# Patient Record
Sex: MALE | NOT HISPANIC OR LATINO | Employment: UNEMPLOYED | URBAN - METROPOLITAN AREA
[De-identification: names, ages, dates, MRNs, and addresses within clinical notes are randomized per-mention and may not be internally consistent; named-entity substitution may affect disease eponyms.]

---
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
## 2019-02-11 DIAGNOSIS — I77.810 AORTIC ROOT DILATION (H): ICD-10-CM

## 2019-02-11 DIAGNOSIS — Q67.7 PECTUS CARINATUM: ICD-10-CM

## 2019-02-11 DIAGNOSIS — Q87.40 MARFAN SYNDROME: Primary | ICD-10-CM

## 2019-02-11 DIAGNOSIS — Q67.6 PECTUS EXCAVATUM: ICD-10-CM

## 2022-01-28 ENCOUNTER — TELEPHONE (OUTPATIENT)
Dept: CARDIOLOGY | Facility: CLINIC | Age: 22
End: 2022-01-28
Payer: COMMERCIAL

## 2022-01-28 DIAGNOSIS — I77.810 AORTIC ROOT DILATION (H): Primary | ICD-10-CM

## 2022-01-28 DIAGNOSIS — Q87.40 MARFAN SYNDROME: ICD-10-CM

## 2022-02-01 NOTE — TELEPHONE ENCOUNTER
Date: 2/1/2022    Time of Call: 2:28 PM     Diagnosis:  Marfan's     [ TORB ] Ordering provider: Dr. Sprague  Order: echo prior to appointment with Dr. Sprague     Order received by: LAURENT Maria     Follow-up/additional notes: Sent to scheduling.

## 2022-03-17 ENCOUNTER — OFFICE VISIT (OUTPATIENT)
Dept: CARDIOLOGY | Facility: CLINIC | Age: 22
End: 2022-03-17
Payer: COMMERCIAL

## 2022-03-17 ENCOUNTER — HOSPITAL ENCOUNTER (OUTPATIENT)
Dept: CARDIOLOGY | Facility: CLINIC | Age: 22
Discharge: HOME | End: 2022-03-17
Attending: INTERNAL MEDICINE | Admitting: INTERNAL MEDICINE
Payer: COMMERCIAL

## 2022-03-17 VITALS
OXYGEN SATURATION: 96 % | BODY MASS INDEX: 29.34 KG/M2 | DIASTOLIC BLOOD PRESSURE: 83 MMHG | WEIGHT: 236 LBS | SYSTOLIC BLOOD PRESSURE: 126 MMHG | HEIGHT: 75 IN | HEART RATE: 83 BPM

## 2022-03-17 DIAGNOSIS — I77.810 AORTIC ROOT DILATION (H): ICD-10-CM

## 2022-03-17 DIAGNOSIS — Q87.40 MARFAN SYNDROME: ICD-10-CM

## 2022-03-17 LAB — LVEF ECHO: NORMAL

## 2022-03-17 PROCEDURE — 93306 TTE W/DOPPLER COMPLETE: CPT

## 2022-03-17 PROCEDURE — 93306 TTE W/DOPPLER COMPLETE: CPT | Mod: 26 | Performed by: INTERNAL MEDICINE

## 2022-03-17 PROCEDURE — 93005 ELECTROCARDIOGRAM TRACING: CPT | Performed by: INTERNAL MEDICINE

## 2022-03-17 PROCEDURE — 99205 OFFICE O/P NEW HI 60 MIN: CPT | Mod: 25 | Performed by: INTERNAL MEDICINE

## 2022-03-17 RX ORDER — LOSARTAN POTASSIUM 25 MG/1
25 TABLET ORAL DAILY
Qty: 90 TABLET | Refills: 3 | Status: SHIPPED | OUTPATIENT
Start: 2022-03-17

## 2022-03-17 NOTE — NURSING NOTE
Med Reconcile: Reviewed and verified all current medications with the patient. The updated medication list was printed and given to the patient.    New Medication: Patient was educated regarding newly prescribed medication, including discussion of  the indication, administration, side effects, and when to report to MD or RN. Patient demonstrated understanding of this information and agreed to call with further questions or concerns.    Return Appointment: Patient given instructions regarding scheduling next clinic visit. Patient demonstrated understanding of this information and agreed to call with further questions or concerns.    Patient stated he understood all health information given and agreed to call with further questions or concerns.

## 2022-03-17 NOTE — PROGRESS NOTES
Service Date: 03/17/2022    HISTORY OF PRESENT ILLNESS:  Mr. Garcia is a pleasant 22-year-old gentleman whom meeting for the first time with a past medical history significant for Marfan syndrome.  This was diagnosed in 2012.  His primary physician suspected Marfan syndrome.  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  This result is consistent with Marfan syndrome.  He was followed by Dr. Abreu.  He was seen by Dr. Castillo as well as Dr. Lee and then most recently Dr. Bowens. His last echocardiogram actually was in 2018, and at that time, I measured his aortic root at 3.8 cm measured out placing his Z-score at 2.6.  He underwent an echo as part of his evaluation today and actually his Z-score is unchanged and his aortic root is unchanged at 3.8.  His ascending aorta measures 3.1 cm.  He has a blood pressure of 126/83, and currently, he is not taking any medications.  His last CT scan was in 2013 and they measured his root at that time at 3.5 cm.  His echo shows no significant valve disease with a normal ejection fraction.     Mr. Garcia has no known family history of Marfan syndrome.  He has 1 half-sister.  He works as a care provider administering medications.  He has no history of joint dislocations.  He does have a history of subtle lens dislocation with possibly some ruptured zonules but has not had a recent Ophthalmology visit but does know he needs to get in.  He does wear contact lenses.  He actually was admitted in January of this year with a pneumothorax.  He did have a known history of blebs in the right hand side but had never had a pneumothorax in the past.    IMPRESSION, REPORT AND PLAN:  Marfan syndrome with mutation in FBN1 gene, aortic root at 3.8 cm, stable since 2018, with a Z-score of 2.6 with a history of a pneumothorax and subtle lens dislocation on the left.    DISCUSSION:  It was a pleasure to be  involved in the care of Mr. Garcia.  I have reviewed his history in detail and his testing as outlined.  I discussed his anatomy through our heart model.  He and his mom actually were thankful for this as they thought this whole time that there was actually something wrong with his valve.  They did not understand it was his aorta that we are following.  We discussed the indication for losartan and the reasoning behind using it and he is going to try a small dose.  It sounds like he tried beta blocker in the past and was not happy with the side effects of this medication from a sexual standpoint so we will never go on that again.  We discussed the next year doing MRA of the chest.  He did have imaging of his head back in 2016 and that was not concerning for any aneurysms with just congenital abnormalities not of concern, so I think it is fine to wait a full 10 years for reimaging from that standpoint.  He knows not to lift heavy weights and exercise restrictions but to otherwise remain active.  I have recommended he get in to see the ophthalmologist.  He did have a history of modified Ravitch repair with placement of a sidebar to stabilize his chest in , removal of the sidebar in , and it sounds like that has been doing well.  He does now know what a pneumothorax feels like and was told to look out for those symptoms in the future.  We will plan to do labs including a TSH, glucose and lipids when he follows up.  All questions were answered.  It was a pleasure to be involved in his care.  Please do not hesitate to contact me with any questions or concerns.    There was 60 minutes of face-to-face, documentation and review of records on the day of visit.    Hu Sprague MD        D: 2022   T: 2022   MT: [email or web address]    Name:     OFELIA GARCIA  MRN:      [id number]        Account:      [id number]   :      2000           Service Date: 2022       Document: [id number]

## 2022-03-17 NOTE — PATIENT INSTRUCTIONS
You were seen today in the Adult Congenital and Cardiovascular Genetics Clinic at the [facility].    Cardiology Providers you saw during your visit:  NIKOLAY Sprague MD    Diagnosis:  Marfan's     Results:  NIKOLAY Sprague MD reviewed the results of your ekg and echo testing today in clinic.    Recommendations:    1. Continue to eat a heart healthy, low salt diet.  2. Continue to get 20-30 minutes of aerobic activity, 4-5 days per week.  Examples of aerobic activity include walking, running, swimming, cycling, etc.  3. Continue to observe good oral hygiene, with regular dental visits.  4. Start Losartan 25 mg DAILY      SBE prophylaxis:   Yes____  No__x__    Lifelong Bacterial Endocarditis Prophylaxis:  YES____  NO____    If YES is checked, follow the recommendations outlined below:  1. Take antibiotic(s) prior to recommended dental procedures and procedures on the respiratory tract or with infected skin, muscle or bones. SBE prophylaxis is not needed for routine GI and  procedures (ie. Colonoscopy or vaginal delivery)  2. Observe good oral hygiene daily, as advised by your dentist. Get regular professional dental care.  3. Keep cuts clean.  4. Infections should be treated promptly.  5. Symptoms of Infective Endocarditis could include: fever lasting more than 4-5 days or a recurrent fever that initially resolves but returns within 1-2 days)      Exercise restrictions:   Yes__X__  No____         If yes, list restrictions:  Must be allowed to rest if fatigued or SOB      Work restrictions:  Yes____  No_X___         If yes, list restrictions:    FASTING CHOLESTEROL was checked in the last 5 years YES___  NO___ Will draw next year  Continue to eat a heart healthy, low salt diet.         ____ Fasting lipid panel order today         ____ No changes in medications          ____ I recommend the following changes in your cholesterol medications.:          ____ Please follow up for cholesterol screening at  your primary care physician      Follow-up:  Follow up with Dr. Sprague in 1 year with chest MRA, thyroid, fasting glucose, and lipid labs prior    If you have questions or concerns please contact us at:    RONAN LomasN, RN    Mallory Benitez (Scheduling)  Nurse Care Coordinator     Clinic   Adult Congenital and CV Genetics   Adult Congenital and CV Genetic  [facility]   [facility]  (P) [phone]     (P) [phone]         (F) [phone]        For after hours urgent needs, call [phone] and ask to speak to the Adult Congenital Physician on call.  Mention Job Code 0401.    For emergencies call 911.    [facility]   Clinics and Surgery Center  Mail Code [id number]  [address] [place]  [address]

## 2022-03-17 NOTE — LETTER
3/17/2022    [facility]  [address]  [place] [address]    RE: Ankur Garcia       Dear Colleague,     I had the pleasure of seeing Ankur Garcia in the [facility].  CARDIOLOGY CONSULTATION:    Please see dictated note    PAST MEDICAL HISTORY:  Past Medical History:   Diagnosis Date     Asthma      Congenital heart disease      Marfan's syndrome      Pectus excavatum      PONV (postoperative nausea and vomiting)        CURRENT MEDICATIONS:  Current Outpatient Medications   Medication Sig Dispense Refill     LORazepam (ATIVAN) 1 MG tablet Take 1 tablet (1 mg) by mouth every 8 hours as needed for anxiety 10 tablet 0     LORazepam (ATIVAN) 1 MG tablet Take 2 tablets (2 mg) by mouth every 8 hours as needed for nausea or vomiting (Take 1-2 mg every 8 hours as needed for nausea/vomiting.) 20 tablet 0     losartan (COZAAR) 50 MG tablet Take 1 tablet (50 mg) by mouth daily 40 tablet 5     QUEtiapine (SEROQUEL) 100 MG tablet Take 100 mg by mouth 2 times daily       ranitidine (ZANTAC) 150 MG tablet Take 1 tablet (150 mg) by mouth 2 times daily 60 tablet 1       PAST SURGICAL HISTORY:  Past Surgical History:   Procedure Laterality Date     ADENOIDECTOMY  2008    also a nasal cautery for recurrent nose bleeds.     APPENDECTOMY  2009     APPENDECTOMY       CHEST SURGERY       NASAL SEPTUM SURGERY       REMOVE HARDWARE PECTUS RAVITCH  7/24/2013    Procedure: REMOVE HARDWARE PECTUS (RAVITCH);  Pectus Bar Removal (Ravitch);  Surgeon: Ryan Castillo MD;  [facility]: [facility]     REPAIR PECTUS CARINATUM  6/20/2012    Procedure: REPAIR PECTUS CARINATUM;;  Surgeon: Ryan Castillo MD;  [facility]     REPAIR PECTUS EXCAVATUM  6/20/2012    Procedure: REPAIR PECTUS EXCAVATUM;  Pectus Excavatum and Carinatum Repair (RAVITCH);  Surgeon: Ryan Castillo MD;  [facility]       ALLERGIES  Grass, Trees, and Penicillins    FAMILY HX:  Family History   Problem Relation Age of Onset  "    Hypertension Mother      Arrhythmia Mother         A Fib     Heart Surgery Maternal Grandfather         Aortic Valve     Cerebrovascular Disease Paternal Grandmother        SOCIAL HX:  Social History     Socioeconomic History     Marital status: Single     Spouse name: Not on file     Number of children: Not on file     Years of education: Not on file     Highest education level: Not on file   Occupational History     Not on file   Tobacco Use     Smoking status: Current Every Day Smoker     Smokeless tobacco: Not on file   Substance and Sexual Activity     Alcohol use: No     Alcohol/week: 0.0 standard drinks     Drug use: Yes     Comment: Drug use: Xanax     Sexual activity: Not on file   Other Topics Concern     Not on file   Social History Narrative     Not on file     Social Determinants of Health     Financial Resource Strain: Not on file   Food Insecurity: Not on file   Transportation Needs: Not on file   Physical Activity: Not on file   Stress: Not on file   Social Connections: Not on file   Intimate Partner Violence: Not on file   Housing Stability: Not on file       ROS:  Constitutional: No fever, chills, or sweats. No weight gain/loss.   ENT: No visual disturbance, ear ache, epistaxis, sore throat.   Allergies/Immunologic: Negative.   Respiratory: No cough, hemoptysis.   Cardiovascular: As per HPI.   GI: No nausea, vomiting, hematemesis, melena, or hematochezia.   : No urinary frequency, dysuria, or hematuria.   Integument: Negative.   Psychiatric: Negative.   Neuro: Negative.   Endocrinology: Negative.   Musculoskeletal: No myalgia.    VITAL SIGNS:  /83   Pulse 83   Ht 1.892 m (6' 2.5\")   Wt 107 kg (236 lb)   SpO2 96%   BMI 29.90 kg/m    Body mass index is 29.9 kg/m .  Wt Readings from Last 2 Encounters:   10/24/18 79.9 kg (176 lb 1.6 oz) (77 %, Z= 0.75)*   07/22/18 73.3 kg (161 lb 9.6 oz) (61 %, Z= 0.29)*     * Growth percentiles are based on CDC (Boys, 2-20 Years) data.       PHYSICAL " EXAM  Ankur Garcia IS A 22 year old male.in no acute distress.  HEENT: Unremarkable.   Lungs: CTA.  Cor: RRR. Normal S1 and S2.  No murmur, rub, or gallop.  Abd: Soft, nontender Extremities: No C/C/E.    LABS    Lab Results   Component Value Date    WBC 8.2 06/19/2019    WBC 8.3 07/22/2018     Lab Results   Component Value Date    RBC 5.44 06/19/2019    RBC 5.14 07/22/2018     Lab Results   Component Value Date    HGB 15.4 06/19/2019    HGB 14.6 07/22/2018     Lab Results   Component Value Date    HCT 45.3 06/19/2019    HCT 42.2 07/22/2018     No components found for: MCT  Lab Results   Component Value Date    MCV 83 06/19/2019    MCV 82 07/22/2018     Lab Results   Component Value Date    MCH 28.3 06/19/2019    MCH 28.4 07/22/2018     Lab Results   Component Value Date    MCHC 34.0 06/19/2019    MCHC 34.6 07/22/2018     Lab Results   Component Value Date    RDW 12.4 06/19/2019    RDW 12.0 07/22/2018     Lab Results   Component Value Date     06/19/2019     07/22/2018      Recent Labs   Lab Test 06/25/19  0501 06/24/19  1444    140   POTASSIUM 3.6 3.2*   CHLORIDE 106 100   CO2 28 25   ANIONGAP 8 15    88   BUN 6* 9   CR 0.92 0.95   WALLY 8.9 9.9       NIKOLAY Sprague MD

## 2022-03-17 NOTE — PROGRESS NOTES
CARDIOLOGY CONSULTATION:    Please see dictated note    PAST MEDICAL HISTORY:  Past Medical History:   Diagnosis Date     Asthma      Congenital heart disease      Marfan's syndrome      Pectus excavatum      PONV (postoperative nausea and vomiting)        CURRENT MEDICATIONS:  Current Outpatient Medications   Medication Sig Dispense Refill     LORazepam (ATIVAN) 1 MG tablet Take 1 tablet (1 mg) by mouth every 8 hours as needed for anxiety 10 tablet 0     LORazepam (ATIVAN) 1 MG tablet Take 2 tablets (2 mg) by mouth every 8 hours as needed for nausea or vomiting (Take 1-2 mg every 8 hours as needed for nausea/vomiting.) 20 tablet 0     losartan (COZAAR) 50 MG tablet Take 1 tablet (50 mg) by mouth daily 40 tablet 5     QUEtiapine (SEROQUEL) 100 MG tablet Take 100 mg by mouth 2 times daily       ranitidine (ZANTAC) 150 MG tablet Take 1 tablet (150 mg) by mouth 2 times daily 60 tablet 1       PAST SURGICAL HISTORY:  Past Surgical History:   Procedure Laterality Date     ADENOIDECTOMY  2008    also a nasal cautery for recurrent nose bleeds.     APPENDECTOMY  2009     APPENDECTOMY       CHEST SURGERY       NASAL SEPTUM SURGERY       REMOVE HARDWARE PECTUS RAVITCH  7/24/2013    Procedure: REMOVE HARDWARE PECTUS (RAVITCH);  Pectus Bar Removal (Ravitch);  Surgeon: Ryan Castillo MD;  [facility]     REPAIR PECTUS CARINATUM  6/20/2012    Procedure: REPAIR PECTUS CARINATUM;;  Surgeon: Ryan Castillo MD;  [facility]     REPAIR PECTUS EXCAVATUM  6/20/2012    Procedure: REPAIR PECTUS EXCAVATUM;  Pectus Excavatum and Carinatum Repair (RAVITCH);  Surgeon: Ryan Castillo MD;  [facility]       ALLERGIES  Grass, Trees, and Penicillins    FAMILY HX:  Family History   Problem Relation Age of Onset     Hypertension Mother      Arrhythmia Mother         A Fib     Heart Surgery Maternal Grandfather         Aortic Valve     Cerebrovascular Disease Paternal Grandmother        SOCIAL HX:  Social History  "    Socioeconomic History     Marital status: Single     Spouse name: Not on file     Number of children: Not on file     Years of education: Not on file     Highest education level: Not on file   Occupational History     Not on file   Tobacco Use     Smoking status: Current Every Day Smoker     Smokeless tobacco: Not on file   Substance and Sexual Activity     Alcohol use: No     Alcohol/week: 0.0 standard drinks     Drug use: Yes     Comment: Drug use: Xanax     Sexual activity: Not on file   Other Topics Concern     Not on file   Social History Narrative     Not on file     Social Determinants of Health     Financial Resource Strain: Not on file   Food Insecurity: Not on file   Transportation Needs: Not on file   Physical Activity: Not on file   Stress: Not on file   Social Connections: Not on file   Intimate Partner Violence: Not on file   Housing Stability: Not on file       ROS:  Constitutional: No fever, chills, or sweats. No weight gain/loss.   ENT: No visual disturbance, ear ache, epistaxis, sore throat.   Allergies/Immunologic: Negative.   Respiratory: No cough, hemoptysis.   Cardiovascular: As per HPI.   GI: No nausea, vomiting, hematemesis, melena, or hematochezia.   : No urinary frequency, dysuria, or hematuria.   Integument: Negative.   Psychiatric: Negative.   Neuro: Negative.   Endocrinology: Negative.   Musculoskeletal: No myalgia.    VITAL SIGNS:  /83   Pulse 83   Ht 1.892 m (6' 2.5\")   Wt 107 kg (236 lb)   SpO2 96%   BMI 29.90 kg/m    Body mass index is 29.9 kg/m .  Wt Readings from Last 2 Encounters:   10/24/18 79.9 kg (176 lb 1.6 oz) (77 %, Z= 0.75)*   07/22/18 73.3 kg (161 lb 9.6 oz) (61 %, Z= 0.29)*     * Growth percentiles are based on CDC (Boys, 2-20 Years) data.       PHYSICAL EXAM  Ankur Garcia IS A 22 year old male.in no acute distress.  HEENT: Unremarkable.   Lungs: CTA.  Cor: RRR. Normal S1 and S2.  No murmur, rub, or gallop.  Abd: Soft, nontender Extremities: No " C/C/E.    LABS    Lab Results   Component Value Date    WBC 8.2 06/19/2019    WBC 8.3 07/22/2018     Lab Results   Component Value Date    RBC 5.44 06/19/2019    RBC 5.14 07/22/2018     Lab Results   Component Value Date    HGB 15.4 06/19/2019    HGB 14.6 07/22/2018     Lab Results   Component Value Date    HCT 45.3 06/19/2019    HCT 42.2 07/22/2018     No components found for: MCT  Lab Results   Component Value Date    MCV 83 06/19/2019    MCV 82 07/22/2018     Lab Results   Component Value Date    MCH 28.3 06/19/2019    MCH 28.4 07/22/2018     Lab Results   Component Value Date    MCHC 34.0 06/19/2019    MCHC 34.6 07/22/2018     Lab Results   Component Value Date    RDW 12.4 06/19/2019    RDW 12.0 07/22/2018     Lab Results   Component Value Date     06/19/2019     07/22/2018      Recent Labs   Lab Test 06/25/19  0501 06/24/19  1444    140   POTASSIUM 3.6 3.2*   CHLORIDE 106 100   CO2 28 25   ANIONGAP 8 15    88   BUN 6* 9   CR 0.92 0.95   WALLY 8.9 9.9       NIKOLAY Sprague MD

## 2023-01-04 ENCOUNTER — TELEPHONE (OUTPATIENT)
Dept: CARDIOLOGY | Facility: CLINIC | Age: 23
End: 2023-01-04

## 2023-01-04 NOTE — TELEPHONE ENCOUNTER
Attempted to reach out to pt to schedule follow ur with March with Fasting Labs and MRA chest prior, no answer, LVM

## 2023-01-04 NOTE — LETTER
February 22, 2023      TO: Ankur Garcia  [address]  [place] [address]         Dear Ankur,    Our records indicate that it is time for you to schedule your return visit with the [facility] in the CARDIOVASCULAR GENETIC CLINIC at the [facility] ([facility]).      To make your appointment, please call: [phone], Monday - Friday, 8 A.M. - 4:30 P.M (Central Time Zone).    Please check with your insurance company about your benefits.  Some insurance plans provide different coverage levels for services at [facility] hospital-based clinics.     We look forward to hearing from you.    Sincerely,    Mallory Benitez  Clinic Coordinator  [facility]  Office: [phone]  Fax: [phone]

## 2023-02-22 NOTE — TELEPHONE ENCOUNTER
2nd attempt.    Sent letter. Max attempts, if patient wishes to still be seen, please call [phone]

## 2024-11-22 ENCOUNTER — HOSPITAL ENCOUNTER (EMERGENCY)
Facility: CLINIC | Age: 24
Discharge: HOME | End: 2024-11-22
Attending: EMERGENCY MEDICINE | Admitting: EMERGENCY MEDICINE
Payer: COMMERCIAL

## 2024-11-22 VITALS
HEART RATE: 67 BPM | TEMPERATURE: 98.1 F | BODY MASS INDEX: 28.6 KG/M2 | WEIGHT: 230 LBS | HEIGHT: 75 IN | SYSTOLIC BLOOD PRESSURE: 136 MMHG | OXYGEN SATURATION: 98 % | RESPIRATION RATE: 18 BRPM | DIASTOLIC BLOOD PRESSURE: 84 MMHG

## 2024-11-22 DIAGNOSIS — F12.288 CANNABIS HYPEREMESIS SYNDROME CONCURRENT WITH AND DUE TO CANNABIS DEPENDENCE (H): ICD-10-CM

## 2024-11-22 LAB
ALBUMIN SERPL BCG-MCNC: 4.7 G/DL (ref 3.5–5.2)
ALP SERPL-CCNC: 70 U/L (ref 40–150)
ALT SERPL W P-5'-P-CCNC: 10 U/L (ref 0–70)
AMPHETAMINES UR QL SCN: ABNORMAL
ANION GAP SERPL CALCULATED.3IONS-SCNC: 16 MMOL/L (ref 7–15)
AST SERPL W P-5'-P-CCNC: 17 U/L (ref 0–45)
BARBITURATES UR QL SCN: ABNORMAL
BASOPHILS # BLD AUTO: 0.1 10E3/UL (ref 0–0.2)
BASOPHILS NFR BLD AUTO: 1 %
BENZODIAZ UR QL SCN: ABNORMAL
BILIRUB SERPL-MCNC: 1 MG/DL
BUN SERPL-MCNC: 10.3 MG/DL (ref 6–20)
BZE UR QL SCN: ABNORMAL
CALCIUM SERPL-MCNC: 9.4 MG/DL (ref 8.8–10.4)
CANNABINOIDS UR QL SCN: ABNORMAL
CHLORIDE SERPL-SCNC: 101 MMOL/L (ref 98–107)
CREAT SERPL-MCNC: 0.76 MG/DL (ref 0.67–1.17)
EGFRCR SERPLBLD CKD-EPI 2021: >90 ML/MIN/1.73M2
EOSINOPHIL # BLD AUTO: 0 10E3/UL (ref 0–0.7)
EOSINOPHIL NFR BLD AUTO: 0 %
ERYTHROCYTE [DISTWIDTH] IN BLOOD BY AUTOMATED COUNT: 12.5 % (ref 10–15)
FENTANYL UR QL: ABNORMAL
GLUCOSE SERPL-MCNC: 131 MG/DL (ref 70–99)
HCO3 SERPL-SCNC: 24 MMOL/L (ref 22–29)
HCT VFR BLD AUTO: 45.4 % (ref 40–53)
HGB BLD-MCNC: 15.5 G/DL (ref 13.3–17.7)
IMM GRANULOCYTES # BLD: 0 10E3/UL
IMM GRANULOCYTES NFR BLD: 0 %
LIPASE SERPL-CCNC: 11 U/L (ref 13–60)
LYMPHOCYTES # BLD AUTO: 1.8 10E3/UL (ref 0.8–5.3)
LYMPHOCYTES NFR BLD AUTO: 19 %
MCH RBC QN AUTO: 27.1 PG (ref 26.5–33)
MCHC RBC AUTO-ENTMCNC: 34.1 G/DL (ref 31.5–36.5)
MCV RBC AUTO: 79 FL (ref 78–100)
MONOCYTES # BLD AUTO: 0.7 10E3/UL (ref 0–1.3)
MONOCYTES NFR BLD AUTO: 7 %
NEUTROPHILS # BLD AUTO: 6.8 10E3/UL (ref 1.6–8.3)
NEUTROPHILS NFR BLD AUTO: 73 %
NRBC # BLD AUTO: 0 10E3/UL
NRBC BLD AUTO-RTO: 0 /100
OPIATES UR QL SCN: ABNORMAL
PCP QUAL URINE (ROCHE): ABNORMAL
PLATELET # BLD AUTO: 314 10E3/UL (ref 150–450)
POTASSIUM SERPL-SCNC: 3.2 MMOL/L (ref 3.4–5.3)
PROT SERPL-MCNC: 7.7 G/DL (ref 6.4–8.3)
RBC # BLD AUTO: 5.73 10E6/UL (ref 4.4–5.9)
SODIUM SERPL-SCNC: 141 MMOL/L (ref 135–145)
WBC # BLD AUTO: 9.4 10E3/UL (ref 4–11)

## 2024-11-22 PROCEDURE — 250N000011 HC RX IP 250 OP 636: Performed by: INTERNAL MEDICINE

## 2024-11-22 PROCEDURE — 80307 DRUG TEST PRSMV CHEM ANLYZR: CPT | Performed by: EMERGENCY MEDICINE

## 2024-11-22 PROCEDURE — 96376 TX/PRO/DX INJ SAME DRUG ADON: CPT

## 2024-11-22 PROCEDURE — 99284 EMERGENCY DEPT VISIT MOD MDM: CPT | Mod: 25

## 2024-11-22 PROCEDURE — 36415 COLL VENOUS BLD VENIPUNCTURE: CPT | Performed by: INTERNAL MEDICINE

## 2024-11-22 PROCEDURE — 83690 ASSAY OF LIPASE: CPT | Performed by: INTERNAL MEDICINE

## 2024-11-22 PROCEDURE — 250N000011 HC RX IP 250 OP 636: Performed by: EMERGENCY MEDICINE

## 2024-11-22 PROCEDURE — 96361 HYDRATE IV INFUSION ADD-ON: CPT

## 2024-11-22 PROCEDURE — 96374 THER/PROPH/DIAG INJ IV PUSH: CPT

## 2024-11-22 PROCEDURE — 96375 TX/PRO/DX INJ NEW DRUG ADDON: CPT

## 2024-11-22 PROCEDURE — 80053 COMPREHEN METABOLIC PANEL: CPT | Performed by: INTERNAL MEDICINE

## 2024-11-22 PROCEDURE — 85025 COMPLETE CBC W/AUTO DIFF WBC: CPT | Performed by: INTERNAL MEDICINE

## 2024-11-22 PROCEDURE — 250N000013 HC RX MED GY IP 250 OP 250 PS 637: Performed by: EMERGENCY MEDICINE

## 2024-11-22 PROCEDURE — 258N000003 HC RX IP 258 OP 636: Performed by: INTERNAL MEDICINE

## 2024-11-22 RX ORDER — HALOPERIDOL 5 MG/ML
5 INJECTION INTRAMUSCULAR ONCE
Status: COMPLETED | OUTPATIENT
Start: 2024-11-22 | End: 2024-11-22

## 2024-11-22 RX ORDER — DIPHENHYDRAMINE HYDROCHLORIDE 50 MG/ML
25 INJECTION INTRAMUSCULAR; INTRAVENOUS ONCE
Status: COMPLETED | OUTPATIENT
Start: 2024-11-22 | End: 2024-11-22

## 2024-11-22 RX ORDER — METOCLOPRAMIDE 5 MG/1
5 TABLET ORAL 3 TIMES DAILY PRN
Qty: 15 TABLET | Refills: 0 | Status: SHIPPED | OUTPATIENT
Start: 2024-11-22

## 2024-11-22 RX ORDER — POTASSIUM CHLORIDE 1500 MG/1
20 TABLET, EXTENDED RELEASE ORAL ONCE
Status: COMPLETED | OUTPATIENT
Start: 2024-11-22 | End: 2024-11-22

## 2024-11-22 RX ADMIN — DIPHENHYDRAMINE HYDROCHLORIDE 25 MG: 50 INJECTION INTRAMUSCULAR; INTRAVENOUS at 10:33

## 2024-11-22 RX ADMIN — HALOPERIDOL LACTATE 5 MG: 5 INJECTION, SOLUTION INTRAMUSCULAR at 09:47

## 2024-11-22 RX ADMIN — SODIUM CHLORIDE, POTASSIUM CHLORIDE, SODIUM LACTATE AND CALCIUM CHLORIDE 1000 ML: 600; 310; 30; 20 INJECTION, SOLUTION INTRAVENOUS at 09:48

## 2024-11-22 RX ADMIN — POTASSIUM CHLORIDE 20 MEQ: 1500 TABLET, EXTENDED RELEASE ORAL at 11:01

## 2024-11-22 RX ADMIN — DIPHENHYDRAMINE HYDROCHLORIDE 25 MG: 50 INJECTION INTRAMUSCULAR; INTRAVENOUS at 09:44

## 2024-11-22 ASSESSMENT — COLUMBIA-SUICIDE SEVERITY RATING SCALE - C-SSRS
1. IN THE PAST MONTH, HAVE YOU WISHED YOU WERE DEAD OR WISHED YOU COULD GO TO SLEEP AND NOT WAKE UP?: NO
6. HAVE YOU EVER DONE ANYTHING, STARTED TO DO ANYTHING, OR PREPARED TO DO ANYTHING TO END YOUR LIFE?: NO
2. HAVE YOU ACTUALLY HAD ANY THOUGHTS OF KILLING YOURSELF IN THE PAST MONTH?: NO

## 2024-11-22 ASSESSMENT — ACTIVITIES OF DAILY LIVING (ADL)
ADLS_ACUITY_SCORE: 0

## 2024-11-22 NOTE — ED PROVIDER NOTES
EMERGENCY DEPARTMENT ENCOUNTER      NAME: Ankur Garcia  AGE: 24 year old male  YOB: 2000  MRN: [id number]  EVALUATION DATE & TIME: 11/22/2024  9:03 AM    PCP: [facility]    ED PROVIDER: Colton Villegas M.D.      Chief Complaint   Patient presents with    Vomiting         FINAL IMPRESSION:  1. Cannabis hyperemesis syndrome concurrent with and due to cannabis dependence (H)          ED COURSE & MEDICAL DECISION MAKING:    Pertinent Labs & Imaging studies reviewed. (See chart for details)  9:25 AM Performed my initial evaluation of the patient.   11:20 AM repeat exam benign.  Patient tolerating p.o., states he feels much improved.  Discussed findings, discharge and close follow-up.        24 year old male presents to the Emergency Department for evaluation of nausea and vomiting. Patient appears non toxic with stable vitals signs, patient is afebrile with no tachycardia or hypoxia, no increased work of breathing.  Lungs are clear, I cannot reproduce any significant tenderness with deep palpation my abdominal exam, certainly no rigidity or distention.  Again patient denies any chest or pulmonary complaints, no abdominal pain whatsoever.  Per review of the medical record, did review discharge summary from [facility] on 2/3/2022 where patient was admitted for acute pneumothorax, Marfan syndrome.  Again here patient denies any chest pain whatsoever, no abdominal pain, lung sounds are clear and present bilaterally.  Certainly nothing to suggest ACS, PE, dissection, pneumothorax, ruptured AAA, no focal tenderness to suggest appendicitis, diverticulitis, cholecystitis or pancreatitis.  Nothing to suggest bowel obstruction, perforation, GI bleed or mesenteric ischemia.  Nothing to suggest nephrolithiasis or pyelonephritis, nothing just testicular torsion.  History and exam consistent with cannabinoid hyperemesis syndrome, considered but low suspicion for dehydration  or electrolyte abnormality.  Nothing to suggest intracranial bleed or mass.  Considered CT imaging of the abdomen pelvis but with benign exam, no pain and reassuring vitals no indication for emergent imaging at this time.  We will obtain screening labs and patient was treated here with IV fluids, Haldol and Benadryl.    Reassessment: Labs by my independent interpretation showed mild hypokalemia we did replete here with p.o. potassium.  Otherwise labs showed no signs of acute kidney injury with a creatinine of 0.76 and no signs of anemia with a hemoglobin of 15.5.  No signs of pancreatitis with a lipase of 11.  Drug screen positive for cannabinoids otherwise negative.  Following our interventions patient felt markedly improved.  Considered admission for continued symptom management but with marked improvement, negative workup and otherwise reassuring exam and vitals feel he is safe for discharge and close follow-up.  Will discharge with a prescription for Reglan and recommend close follow-up with primary care in the next 5 to 7 days.  Discussed these findings recommendations with the patient felt reassured and comfortable discharge.  Return precaution provided.    Medical Decision Making  Obtained supplemental history:Supplemental history obtained?: Documented in chart and Family Member/Significant Other  Reviewed external records: External records reviewed?: Documented in chart  Care impacted by chronic illness:Other: Marfan Syndrome  Did you consider but not order tests?: Work up considered but not performed and documented in chart, if applicable  Did you interpret images independently?: Independent interpretation of ECG and images noted in documentation, when applicable.  Consultation discussion with other provider:Did you involve another provider (consultant, MH, pharmacy, etc.)?: No  Discharge. I prescribed additional prescription strength medication(s) as charted. I considered admission, but discharged patient  after significant clinical improvement.    MIPS: Not Applicable        At the conclusion of the encounter I discussed the results of all of the tests and the disposition. The questions were answered and return precautions provided. The patient or family acknowledged understanding and was agreeable with the care plan.         MEDICATIONS GIVEN IN THE EMERGENCY:  Medications   haloperidol lactate (HALDOL) injection 5 mg (5 mg Intravenous $Given 11/22/24 0910)   diphenhydrAMINE (BENADRYL) injection 25 mg (25 mg Intravenous $Given 11/22/24 0944)   lactated ringers BOLUS 1,000 mL (0 mLs Intravenous Stopped 11/22/24 1103)   diphenhydrAMINE (BENADRYL) injection 25 mg (25 mg Intravenous $Given 11/22/24 1033)   potassium chloride reji ER (KLOR-CON M20) CR tablet 20 mEq (20 mEq Oral $Given 11/22/24 1101)       NEW PRESCRIPTIONS STARTED AT TODAY'S ER VISIT  Discharge Medication List as of 11/22/2024 11:49 AM        START taking these medications    Details   metoclopramide (REGLAN) 5 MG tablet Take 1 tablet (5 mg) by mouth 3 times daily as needed., Disp-15 tablet, R-0, E-Prescribe                  =================================================================    HPI    Patient information was obtained from: patient, mother    Use of Intrepreter: N/A         Ankur Garcia is a 24 year old male who presents nausea and vomiting.  Patient states that he has had persistent nausea and vomiting for the past 3 days.  Made worse with any type of p.o. intake.  States he has had similar symptoms in the past secondary to marijuana use.  He stopped taking marijuana 3 days ago.  States symptoms somewhat improved.  Otherwise, he denies any abdominal pain, change in bowel or bladder habits, chest pain, shortness of breath, blood in his urine or stools, dysuria, testicular pain, change in medications, falls or trauma, headache, or focal weakness.    VITALS:  Patient Vitals for the past 24 hrs:   BP Temp Temp src Pulse Resp SpO2 Height  "Weight   11/22/24 1158 136/84 -- -- 67 18 98 % -- --   11/22/24 1115 -- -- -- 66 (!) 35 95 % -- --   11/22/24 1100 114/74 -- -- 85 20 98 % -- --   11/22/24 1030 131/63 -- -- 88 23 97 % -- --   11/22/24 1015 -- -- -- 59 14 97 % -- --   11/22/24 1000 136/75 -- -- 56 19 99 % -- --   11/22/24 0915 -- -- -- 72 -- 99 % -- --   11/22/24 0905 (!) 166/102 98.1  F (36.7  C) Oral 67 18 99 % 1.905 m (6' 3\") 104.3 kg (230 lb)        PHYSICAL EXAM    Constitutional:  Awake, alert, in no apparent distress  HENT:  Normocephalic, Atraumatic. Bilateral external ears normal. Oropharynx moist. Nose normal. Neck- Normal range of motion with no guarding, No midline cervical tenderness, Supple, No stridor.   Eyes:  PERRL, EOMI with no signs of entrapment, Conjunctiva normal, No discharge.   Respiratory:  Normal breath sounds, No respiratory distress, No wheezing.    Cardiovascular:  Normal heart rate, Normal rhythm, No appreciable rubs or gallops.   GI:  Soft, No tenderness, No distension, No palpable masses  Gu: No CVA tenderness  Musculoskeletal:  Intact distal pulses, No edema. Good range of motion in all major joints. No tenderness to palpation or major deformities noted.  Integument:  Warm, Dry, No erythema, No rash.   Neurologic:  Alert & oriented, Normal motor function, Normal sensory function, No focal deficits noted.   Psychiatric:  Affect normal, Judgment normal, Mood normal.     LAB:  All pertinent labs reviewed and interpreted.  Results for orders placed or performed during the hospital encounter of 11/22/24   Comprehensive metabolic panel   Result Value Ref Range    Sodium 141 135 - 145 mmol/L    Potassium 3.2 (L) 3.4 - 5.3 mmol/L    Carbon Dioxide (CO2) 24 22 - 29 mmol/L    Anion Gap 16 (H) 7 - 15 mmol/L    Urea Nitrogen 10.3 6.0 - 20.0 mg/dL    Creatinine 0.76 0.67 - 1.17 mg/dL    GFR Estimate >90 >60 mL/min/1.73m2    Calcium 9.4 8.8 - 10.4 mg/dL    Chloride 101 98 - 107 mmol/L    Glucose 131 (H) 70 - 99 mg/dL    Alkaline " Phosphatase 70 40 - 150 U/L    AST 17 0 - 45 U/L    ALT 10 0 - 70 U/L    Protein Total 7.7 6.4 - 8.3 g/dL    Albumin 4.7 3.5 - 5.2 g/dL    Bilirubin Total 1.0 <=1.2 mg/dL   Result Value Ref Range    Lipase 11 (L) 13 - 60 U/L   CBC with platelets and differential   Result Value Ref Range    WBC Count 9.4 4.0 - 11.0 10e3/uL    RBC Count 5.73 4.40 - 5.90 10e6/uL    Hemoglobin 15.5 13.3 - 17.7 g/dL    Hematocrit 45.4 40.0 - 53.0 %    MCV 79 78 - 100 fL    MCH 27.1 26.5 - 33.0 pg    MCHC 34.1 31.5 - 36.5 g/dL    RDW 12.5 10.0 - 15.0 %    Platelet Count 314 150 - 450 10e3/uL    % Neutrophils 73 %    % Lymphocytes 19 %    % Monocytes 7 %    % Eosinophils 0 %    % Basophils 1 %    % Immature Granulocytes 0 %    NRBCs per 100 WBC 0 <1 /100    Absolute Neutrophils 6.8 1.6 - 8.3 10e3/uL    Absolute Lymphocytes 1.8 0.8 - 5.3 10e3/uL    Absolute Monocytes 0.7 0.0 - 1.3 10e3/uL    Absolute Eosinophils 0.0 0.0 - 0.7 10e3/uL    Absolute Basophils 0.1 0.0 - 0.2 10e3/uL    Absolute Immature Granulocytes 0.0 <=0.4 10e3/uL    Absolute NRBCs 0.0 10e3/uL   Urine Drug Screen Panel   Result Value Ref Range    Amphetamines Urine Screen Negative Screen Negative    Barbituates Urine Screen Negative Screen Negative    Benzodiazepine Urine Screen Negative Screen Negative    Cannabinoids Urine Screen Positive (A) Screen Negative    Cocaine Urine Screen Negative Screen Negative    Fentanyl Qual Urine Screen Negative Screen Negative    Opiates Urine Screen Negative Screen Negative    PCP Urine Screen Negative Screen Negative       RADIOLOGY:  No orders to display          EKG:      I have independently reviewed and interpreted the EKG(s) documented above.    PROCEDURES:        [employer] Documentation:   CMS Diagnoses:       ICOLTON MD, am serving as a scribe to document services personally performed by Colton Villegas MD, based on my observation and the provider's statements to me. IColton MD attest that  COLTON VILLEGAS MD is acting in a scribe capacity, has observed my performance of the services and has documented them in accordance with my direction.    Colton Villegas M.D.  Emergency Medicine  [facility] EMERGENCY ROOM  [address] [facility] [address]  [phone]  Dept: [phone]       Colton Villegas MD  11/22/24 1211

## 2024-11-22 NOTE — Clinical Note
Ankur Garcia was seen and treated in our emergency department on 11/22/2024.  He may return to work on 11/23/2024.       If you have any questions or concerns, please don't hesitate to call.      Mercedes Baumann MD

## 2024-11-22 NOTE — ED NOTES
Pt resting on cart, states nausea and vomiting is better but feeling restless. Denies pain. Pt asked if was able to provide ruine sample that was ordered and states she does not feel like he can at this time.

## 2024-11-22 NOTE — ED TRIAGE NOTES
Pt c/o vomiting for 3 days, states recovering from percocet abuse is on suboxone, does smoke marijuana and stats hx of vomiting due to cannabis use in past and thinks this is whats going on today, can't keep anything down. Denies pain

## 2024-11-22 NOTE — DISCHARGE INSTRUCTIONS
Patient should avoid cannabis to prevent reoccurrence of nausea and vomiting.  Take Reglan as needed for nausea and vomiting. Please maintain hydration.

## 2024-12-03 ENCOUNTER — NURSE TRIAGE (OUTPATIENT)
Dept: FAMILY MEDICINE | Facility: CLINIC | Age: 24
End: 2024-12-03
Payer: COMMERCIAL

## 2024-12-03 NOTE — TELEPHONE ENCOUNTER
"Nurse Triage SBAR    Is this a 2nd Level Triage? NO, patient does not have PCP with [facility].     Situation: Patient calling in with complaint of a weird feeling in his chest. Denies pain, light headedness, shortness of breath.     Background: History of Marfan syndrome for which he follows with cardiology as he states one of his valves is \"opening up\".     Assessment: States it feels like his hard beats really hard or fast at times. Does not know his heart rate during these episodes. It feels like a panic attack but it is not actually a panic attack.       Protocol Recommended Disposition:   See in Office Today    Recommendation: No available appointments today with [facility] or [place] providers. Patient will go to an urgent care. He stated understanding and had no further questions.     Does the patient meet one of the following criteria for ADS visit consideration? No    Reason for Disposition   History of heart disease (i.e., heart attack, bypass surgery, angina, angioplasty)  (Exception: Brief heartbeat symptoms that went away and now feels well.)    Additional Information   Negative: Passed out (e.g., fainted, lost consciousness, blacked out and was not responding)   Negative: Shock suspected (e.g., cold/pale/clammy skin, too weak to stand, low BP, rapid pulse)   Negative: Difficult to awaken or acting confused (e.g., disoriented, slurred speech)   Negative: Visible sweat on face or sweat dripping down face   Negative: Unable to walk, or can only walk with assistance (e.g., requires support)   Negative: Received SHOCK from implantable cardiac defibrillator and has persisting symptoms (i.e., palpitations, lightheadedness)   Negative: Feeling weak or lightheaded (e.g., woozy, feeling like they might faint) AND heart beating very rapidly (e.g., > 140 / minute)   Negative: Feeling weak or lightheaded (e.g., woozy, feeling like they might faint) AND heart beating very slowly (e.g., < 50 / minute)   Negative: Sounds " like a life-threatening emergency to the triager   Negative: Chest pain   Negative: Difficulty breathing   Negative: Feeling weak or lightheaded (e.g., woozy, feeling like they might faint)   Negative: Heart beating very rapidly (e.g., > 140 / minute) and present now  (Exception: During exercise.)   Negative: Heart beating very slowly (e.g., < 50 / minute)  (Exception: Athlete and heart rate normal for caller.)   Negative: New or worsened shortness of breath with activity (dyspnea on exertion)   Negative: Patient sounds very sick or weak to the triager   Negative: Wearing a cardiac monitor (e.g., cardiac event, Holter)   Negative: Received SHOCK from implantable cardiac defibrillator (and now feels well)   Negative: Skipped or extra beat(s) and occurs 4 or more times per minute   Negative: Heart beating very rapidly (e.g., > 140 / minute) and not present now  (Exception: During exercise.)   Negative: Skipped or extra beat(s) and increases with exercise or exertion    Protocols used: Heart Rate and Heartbeat [id number]

## 2025-03-23 ENCOUNTER — HEALTH MAINTENANCE LETTER (OUTPATIENT)
Age: 25
End: 2025-03-23

## 2025-04-08 ENCOUNTER — MYC REFILL (OUTPATIENT)
Dept: FAMILY MEDICINE | Facility: CLINIC | Age: 25
End: 2025-04-08
Payer: COMMERCIAL

## 2025-04-08 DIAGNOSIS — F19.90 SUBSTANCE USE DISORDER: ICD-10-CM

## 2025-04-10 RX ORDER — BUPRENORPHINE AND NALOXONE 2; .5 MG/1; MG/1
1 FILM, SOLUBLE BUCCAL; SUBLINGUAL DAILY
Qty: 30 FILM | Refills: 1 | Status: SHIPPED | OUTPATIENT
Start: 2025-04-10

## 2025-04-15 ENCOUNTER — MYC REFILL (OUTPATIENT)
Dept: CARDIOLOGY | Facility: CLINIC | Age: 25
End: 2025-04-15
Payer: COMMERCIAL

## 2025-04-15 DIAGNOSIS — Q87.40 MARFAN SYNDROME: Primary | ICD-10-CM

## 2025-04-15 DIAGNOSIS — I77.810 AORTIC ROOT DILATION: ICD-10-CM

## 2025-04-15 DIAGNOSIS — Q87.40 MARFAN SYNDROME: ICD-10-CM

## 2025-04-15 RX ORDER — LOSARTAN POTASSIUM 25 MG/1
25 TABLET ORAL DAILY
Qty: 90 TABLET | Refills: 0 | Status: SHIPPED | OUTPATIENT
Start: 2025-04-15

## 2025-04-15 RX ORDER — LOSARTAN POTASSIUM 50 MG/1
50 TABLET ORAL
Qty: 30 TABLET | Refills: 1 | Status: SHIPPED | OUTPATIENT
Start: 2025-04-15

## 2025-06-07 ENCOUNTER — MYC REFILL (OUTPATIENT)
Dept: FAMILY MEDICINE | Facility: CLINIC | Age: 25
End: 2025-06-07
Payer: COMMERCIAL

## 2025-06-07 DIAGNOSIS — F19.90 SUBSTANCE USE DISORDER: ICD-10-CM

## 2025-06-09 RX ORDER — BUPRENORPHINE AND NALOXONE 2; .5 MG/1; MG/1
1 FILM, SOLUBLE BUCCAL; SUBLINGUAL DAILY
Qty: 30 FILM | Refills: 1 | Status: SHIPPED | OUTPATIENT
Start: 2025-06-09

## 2025-06-25 DIAGNOSIS — Q87.40 MARFAN SYNDROME: ICD-10-CM

## 2025-06-26 RX ORDER — LOSARTAN POTASSIUM 50 MG/1
50 TABLET ORAL DAILY
Qty: 90 TABLET | Refills: 3 | Status: SHIPPED | OUTPATIENT
Start: 2025-06-26

## 2025-08-11 ENCOUNTER — MYC REFILL (OUTPATIENT)
Dept: FAMILY MEDICINE | Facility: CLINIC | Age: 25
End: 2025-08-11
Payer: COMMERCIAL

## 2025-08-11 DIAGNOSIS — F19.90 SUBSTANCE USE DISORDER: ICD-10-CM

## 2025-08-11 RX ORDER — BUPRENORPHINE AND NALOXONE 2; .5 MG/1; MG/1
1 FILM, SOLUBLE BUCCAL; SUBLINGUAL DAILY
Qty: 7 FILM | Refills: 0 | Status: SHIPPED | OUTPATIENT
Start: 2025-08-11 | End: 2025-08-18

## 2025-08-14 ENCOUNTER — E-VISIT (OUTPATIENT)
Dept: URGENT CARE | Facility: CLINIC | Age: 25
End: 2025-08-14
Payer: COMMERCIAL

## 2025-08-14 DIAGNOSIS — J06.9 ACUTE UPPER RESPIRATORY INFECTION, UNSPECIFIED: Primary | ICD-10-CM

## 2025-08-18 ENCOUNTER — OFFICE VISIT (OUTPATIENT)
Dept: FAMILY MEDICINE | Facility: CLINIC | Age: 25
End: 2025-08-18
Payer: COMMERCIAL

## 2025-08-18 VITALS
HEART RATE: 83 BPM | OXYGEN SATURATION: 97 % | BODY MASS INDEX: 25.49 KG/M2 | SYSTOLIC BLOOD PRESSURE: 142 MMHG | DIASTOLIC BLOOD PRESSURE: 60 MMHG | WEIGHT: 200.7 LBS | RESPIRATION RATE: 16 BRPM

## 2025-08-18 DIAGNOSIS — F19.90 SUBSTANCE USE DISORDER: Primary | ICD-10-CM

## 2025-08-18 DIAGNOSIS — R06.2 WHEEZING: ICD-10-CM

## 2025-08-18 DIAGNOSIS — Q87.40 MARFAN SYNDROME: ICD-10-CM

## 2025-08-18 DIAGNOSIS — I77.810 AORTIC ROOT DILATION: ICD-10-CM

## 2025-08-18 PROCEDURE — G2211 COMPLEX E/M VISIT ADD ON: HCPCS

## 2025-08-18 PROCEDURE — 3078F DIAST BP <80 MM HG: CPT

## 2025-08-18 PROCEDURE — 3077F SYST BP >= 140 MM HG: CPT

## 2025-08-18 PROCEDURE — 99214 OFFICE O/P EST MOD 30 MIN: CPT

## 2025-08-18 RX ORDER — BUSPIRONE HYDROCHLORIDE 5 MG/1
5 TABLET ORAL 2 TIMES DAILY
Qty: 30 TABLET | Refills: 2 | Status: SHIPPED | OUTPATIENT
Start: 2025-08-18

## 2025-08-18 RX ORDER — BUPRENORPHINE AND NALOXONE 2; .5 MG/1; MG/1
1 FILM, SOLUBLE BUCCAL; SUBLINGUAL DAILY
Qty: 30 FILM | Refills: 2 | Status: SHIPPED | OUTPATIENT
Start: 2025-08-18

## 2025-08-18 RX ORDER — LOSARTAN POTASSIUM 50 MG/1
50 TABLET ORAL DAILY
COMMUNITY
Start: 2025-08-18

## 2025-08-18 RX ORDER — ALBUTEROL SULFATE 90 UG/1
2 INHALANT RESPIRATORY (INHALATION) EVERY 6 HOURS PRN
Qty: 18 G | Refills: 2 | Status: SHIPPED | OUTPATIENT
Start: 2025-08-18